# Patient Record
Sex: MALE | Race: WHITE | NOT HISPANIC OR LATINO
[De-identification: names, ages, dates, MRNs, and addresses within clinical notes are randomized per-mention and may not be internally consistent; named-entity substitution may affect disease eponyms.]

---

## 2021-05-03 PROBLEM — Z00.00 ENCOUNTER FOR PREVENTIVE HEALTH EXAMINATION: Status: ACTIVE | Noted: 2021-05-03

## 2021-05-04 ENCOUNTER — APPOINTMENT (OUTPATIENT)
Dept: OTOLARYNGOLOGY | Facility: CLINIC | Age: 26
End: 2021-05-04
Payer: COMMERCIAL

## 2021-05-04 VITALS
HEIGHT: 69 IN | WEIGHT: 211 LBS | BODY MASS INDEX: 31.25 KG/M2 | TEMPERATURE: 95.4 F | HEART RATE: 50 BPM | DIASTOLIC BLOOD PRESSURE: 72 MMHG | SYSTOLIC BLOOD PRESSURE: 145 MMHG

## 2021-05-04 DIAGNOSIS — Z78.9 OTHER SPECIFIED HEALTH STATUS: ICD-10-CM

## 2021-05-04 DIAGNOSIS — Z82.49 FAMILY HISTORY OF ISCHEMIC HEART DISEASE AND OTHER DISEASES OF THE CIRCULATORY SYSTEM: ICD-10-CM

## 2021-05-04 DIAGNOSIS — H92.02 OTALGIA, LEFT EAR: ICD-10-CM

## 2021-05-04 DIAGNOSIS — F17.200 NICOTINE DEPENDENCE, UNSPECIFIED, UNCOMPLICATED: ICD-10-CM

## 2021-05-04 PROCEDURE — 92550 TYMPANOMETRY & REFLEX THRESH: CPT

## 2021-05-04 PROCEDURE — 92557 COMPREHENSIVE HEARING TEST: CPT

## 2021-05-04 PROCEDURE — 99204 OFFICE O/P NEW MOD 45 MIN: CPT

## 2021-05-04 PROCEDURE — 99072 ADDL SUPL MATRL&STAF TM PHE: CPT

## 2021-05-04 PROCEDURE — 92700 UNLISTED ORL SERVICE/PX: CPT

## 2021-05-04 NOTE — PHYSICAL EXAM
[FreeTextEntry1] : Au: EAC clear and dry, TM intact and mobile, ME clear [Midline] : trachea located in midline position [Normal] : no rashes

## 2021-05-04 NOTE — HISTORY OF PRESENT ILLNESS
[de-identified] : 28M here for initial evaluation.\par \par For the past few years or so, he c/o intermittent left sided ear sx including sensitivity, discomfort and pressure. He thinks triggers include sleeping on the left ear, swimming or driving a car w the window open. Sometimes he feels a pulsing in the left ear and occasional popping. There is no otorrhea, otalgia, vertigo or hearing loss.  \par \par Audiogram from today:\par -hearing symmetric and wnl\par -R SRT 10, 100% discrim; L SRT 15, 90% discrim\par -type As tymps\par -ETF nl left\par \par ROS otherwise unremarkable.

## 2021-05-04 NOTE — ASSESSMENT
[FreeTextEntry1] : 28M here for initial evaluation. For the past few years or so, he c/o intermittent left sided ear sx including sensitivity, discomfort and pressure. He thinks triggers include sleeping on the left ear, swimming or driving a car w the window open. Sometimes he feels a pulsing in the left ear and occasional popping. There is no otorrhea, otalgia, vertigo or hearing loss. Audiometry from today is completely unremarkable, as is left sided eustachian tube function. Head and neck exam is normal.\par Unclear etiology to sx - could be element of eustachian tube dysfunction? either way, exam and audiogram are normal. Would recommend avoiding triggers (high jack diving, windows down when driving etc) and using occlusive plug when swimming. Can try sudafed and flonase as needed. RTO as needed.